# Patient Record
Sex: MALE | Race: WHITE | URBAN - METROPOLITAN AREA
[De-identification: names, ages, dates, MRNs, and addresses within clinical notes are randomized per-mention and may not be internally consistent; named-entity substitution may affect disease eponyms.]

---

## 2018-08-04 ENCOUNTER — HOSPITAL ENCOUNTER (OUTPATIENT)
Dept: MEDSURG UNIT | Facility: HOSPITAL | Age: 33
End: 2018-08-07
Attending: INTERNAL MEDICINE | Admitting: INTERNAL MEDICINE

## 2018-08-04 LAB
ABS NEUT (OLG): 22.72 X10(3)/MCL (ref 2.1–9.2)
ALBUMIN SERPL-MCNC: 2.9 GM/DL (ref 3.4–5)
ALBUMIN/GLOB SERPL: 0.5 RATIO (ref 1.1–2)
ALP SERPL-CCNC: 112 UNIT/L (ref 50–136)
ALT SERPL-CCNC: 25 UNIT/L (ref 12–78)
AST SERPL-CCNC: 13 UNIT/L (ref 15–37)
BASOPHILS # BLD AUTO: 0.1 X10(3)/MCL (ref 0–0.2)
BASOPHILS NFR BLD AUTO: 0 %
BILIRUB SERPL-MCNC: 0.7 MG/DL (ref 0.2–1)
BILIRUBIN DIRECT+TOT PNL SERPL-MCNC: 0.3 MG/DL (ref 0–0.5)
BILIRUBIN DIRECT+TOT PNL SERPL-MCNC: 0.4 MG/DL (ref 0–0.8)
BUN SERPL-MCNC: 9 MG/DL (ref 7–18)
CALCIUM SERPL-MCNC: 9.4 MG/DL (ref 8.5–10.1)
CHLORIDE SERPL-SCNC: 104 MMOL/L (ref 98–107)
CO2 SERPL-SCNC: 25 MMOL/L (ref 21–32)
CREAT SERPL-MCNC: 0.91 MG/DL (ref 0.7–1.3)
EOSINOPHIL # BLD AUTO: 0.2 X10(3)/MCL (ref 0–0.9)
EOSINOPHIL NFR BLD AUTO: 1 %
ERYTHROCYTE [DISTWIDTH] IN BLOOD BY AUTOMATED COUNT: 14 % (ref 11.5–17)
GLOBULIN SER-MCNC: 5.4 GM/DL (ref 2.4–3.5)
GLUCOSE SERPL-MCNC: 97 MG/DL (ref 74–106)
HCT VFR BLD AUTO: 46.2 % (ref 42–52)
HGB BLD-MCNC: 15.4 GM/DL (ref 14–18)
LACTATE SERPL-SCNC: 0.8 MMOL/L (ref 0.4–2)
LYMPHOCYTES # BLD AUTO: 2.6 X10(3)/MCL (ref 0.6–4.6)
LYMPHOCYTES NFR BLD AUTO: 9 %
MCH RBC QN AUTO: 30.1 PG (ref 27–31)
MCHC RBC AUTO-ENTMCNC: 33.3 GM/DL (ref 33–36)
MCV RBC AUTO: 90.4 FL (ref 80–94)
MONOCYTES # BLD AUTO: 2.2 X10(3)/MCL (ref 0.1–1.3)
MONOCYTES NFR BLD AUTO: 8 %
NEUTROPHILS # BLD AUTO: 22.72 X10(3)/MCL (ref 1.4–7.9)
NEUTROPHILS NFR BLD AUTO: 81 %
PLATELET # BLD AUTO: 321 X10(3)/MCL (ref 130–400)
PMV BLD AUTO: 10.4 FL (ref 9.4–12.4)
POTASSIUM SERPL-SCNC: 3.4 MMOL/L (ref 3.5–5.1)
PROT SERPL-MCNC: 8.3 GM/DL (ref 6.4–8.2)
RBC # BLD AUTO: 5.11 X10(6)/MCL (ref 4.7–6.1)
SODIUM SERPL-SCNC: 140 MMOL/L (ref 136–145)
WBC # SPEC AUTO: 28 X10(3)/MCL (ref 4.5–11.5)

## 2018-08-05 LAB
ABS NEUT (OLG): 17.15 X10(3)/MCL (ref 2.1–9.2)
BASOPHILS # BLD AUTO: 0 X10(3)/MCL (ref 0–0.2)
BASOPHILS NFR BLD AUTO: 0 %
BUN SERPL-MCNC: 11 MG/DL (ref 7–18)
CALCIUM SERPL-MCNC: 8.9 MG/DL (ref 8.5–10.1)
CHLORIDE SERPL-SCNC: 104 MMOL/L (ref 98–107)
CO2 SERPL-SCNC: 26 MMOL/L (ref 21–32)
CREAT SERPL-MCNC: 0.84 MG/DL (ref 0.7–1.3)
CREAT/UREA NIT SERPL: 13.1
ERYTHROCYTE [DISTWIDTH] IN BLOOD BY AUTOMATED COUNT: 14.2 % (ref 11.5–17)
GLUCOSE SERPL-MCNC: 155 MG/DL (ref 74–106)
HCT VFR BLD AUTO: 46.3 % (ref 42–52)
HGB BLD-MCNC: 15.2 GM/DL (ref 14–18)
HIV 1+2 AB+HIV1 P24 AG SERPL QL IA: NEGATIVE
LYMPHOCYTES # BLD AUTO: 1.7 X10(3)/MCL (ref 0.6–4.6)
LYMPHOCYTES NFR BLD AUTO: 9 %
MCH RBC QN AUTO: 30.4 PG (ref 27–31)
MCHC RBC AUTO-ENTMCNC: 32.8 GM/DL (ref 33–36)
MCV RBC AUTO: 92.6 FL (ref 80–94)
MONOCYTES # BLD AUTO: 0.3 X10(3)/MCL (ref 0.1–1.3)
MONOCYTES NFR BLD AUTO: 1 %
NEUTROPHILS # BLD AUTO: 17.15 X10(3)/MCL (ref 1.4–7.9)
NEUTROPHILS NFR BLD AUTO: 88 %
PLATELET # BLD AUTO: 281 X10(3)/MCL (ref 130–400)
PMV BLD AUTO: 10.5 FL (ref 9.4–12.4)
POTASSIUM SERPL-SCNC: 4.6 MMOL/L (ref 3.5–5.1)
RBC # BLD AUTO: 5 X10(6)/MCL (ref 4.7–6.1)
SODIUM SERPL-SCNC: 140 MMOL/L (ref 136–145)
WBC # SPEC AUTO: 19.4 X10(3)/MCL (ref 4.5–11.5)

## 2018-08-06 LAB
ABS NEUT (OLG): 14.43 X10(3)/MCL (ref 2.1–9.2)
ALBUMIN SERPL-MCNC: 2.2 GM/DL (ref 3.4–5)
ALBUMIN/GLOB SERPL: 0.6 {RATIO}
ALP SERPL-CCNC: 100 UNIT/L (ref 50–136)
ALT SERPL-CCNC: 35 UNIT/L (ref 12–78)
AST SERPL-CCNC: 27 UNIT/L (ref 15–37)
BILIRUB SERPL-MCNC: 0.3 MG/DL (ref 0.2–1)
BILIRUBIN DIRECT+TOT PNL SERPL-MCNC: 0.1 MG/DL (ref 0–0.2)
BILIRUBIN DIRECT+TOT PNL SERPL-MCNC: 0.2 MG/DL (ref 0–0.8)
BUN SERPL-MCNC: 12 MG/DL (ref 7–18)
CALCIUM SERPL-MCNC: 8.7 MG/DL (ref 8.5–10.1)
CHLORIDE SERPL-SCNC: 106 MMOL/L (ref 98–107)
CO2 SERPL-SCNC: 26 MMOL/L (ref 21–32)
CREAT SERPL-MCNC: 0.79 MG/DL (ref 0.7–1.3)
ERYTHROCYTE [DISTWIDTH] IN BLOOD BY AUTOMATED COUNT: 14.4 % (ref 11.5–17)
EST. AVERAGE GLUCOSE BLD GHB EST-MCNC: 114 MG/DL
FINAL CULTURE: NORMAL
GLOBULIN SER-MCNC: 3.8 GM/DL (ref 2.4–3.5)
GLUCOSE SERPL-MCNC: 235 MG/DL (ref 74–106)
HBA1C MFR BLD: 5.6 % (ref 4.2–6.3)
HCT VFR BLD AUTO: 43.3 % (ref 42–52)
HGB BLD-MCNC: 13.7 GM/DL (ref 14–18)
LYMPHOCYTES NFR BLD MANUAL: 9 % (ref 13–40)
MCH RBC QN AUTO: 29.8 PG (ref 27–31)
MCHC RBC AUTO-ENTMCNC: 31.6 GM/DL (ref 33–36)
MCV RBC AUTO: 94.3 FL (ref 80–94)
MONOCYTES NFR BLD MANUAL: 3 % (ref 2–11)
NEUTROPHILS NFR BLD MANUAL: 88 % (ref 47–80)
PLATELET # BLD AUTO: 273 X10(3)/MCL (ref 130–400)
PLATELET # BLD EST: NORMAL 10*3/UL
PMV BLD AUTO: 10.8 FL (ref 7.4–10.4)
POTASSIUM SERPL-SCNC: 4.7 MMOL/L (ref 3.5–5.1)
PROT SERPL-MCNC: 6 GM/DL (ref 6.4–8.2)
RAPID GROUP A STREP (OHS): NEGATIVE
RBC # BLD AUTO: 4.59 X10(6)/MCL (ref 4.7–6.1)
SODIUM SERPL-SCNC: 142 MMOL/L (ref 136–145)
WBC # SPEC AUTO: 17.6 X10(3)/MCL (ref 4.5–11.5)

## 2018-08-07 LAB
ABS NEUT (OLG): 13.38 X10(3)/MCL (ref 2.1–9.2)
BASOPHILS # BLD AUTO: 0.1 X10(3)/MCL (ref 0–0.2)
BASOPHILS NFR BLD AUTO: 0 %
ERYTHROCYTE [DISTWIDTH] IN BLOOD BY AUTOMATED COUNT: 14.4 % (ref 11.5–17)
HCT VFR BLD AUTO: 43.2 % (ref 42–52)
HGB BLD-MCNC: 14.1 GM/DL (ref 14–18)
LYMPHOCYTES # BLD AUTO: 3 X10(3)/MCL (ref 0.6–4.6)
LYMPHOCYTES NFR BLD AUTO: 17 %
MCH RBC QN AUTO: 30.5 PG (ref 27–31)
MCHC RBC AUTO-ENTMCNC: 32.6 GM/DL (ref 33–36)
MCV RBC AUTO: 93.3 FL (ref 80–94)
MONOCYTES # BLD AUTO: 1 X10(3)/MCL (ref 0.1–1.3)
MONOCYTES NFR BLD AUTO: 6 %
NEUTROPHILS # BLD AUTO: 13.38 X10(3)/MCL (ref 1.4–7.9)
NEUTROPHILS NFR BLD AUTO: 74 %
PLATELET # BLD AUTO: 297 X10(3)/MCL (ref 130–400)
PMV BLD AUTO: 11.2 FL (ref 9.4–12.4)
RBC # BLD AUTO: 4.63 X10(6)/MCL (ref 4.7–6.1)
WBC # SPEC AUTO: 18.1 X10(3)/MCL (ref 4.5–11.5)

## 2018-08-11 LAB
FINAL CULTURE: NORMAL
FINAL CULTURE: NORMAL

## 2022-04-30 NOTE — CONSULTS
DATE OF CONSULTATION:  08/05/2018    ATTENDING PHYSICIAN:  Dr. Coleman Black MD  CONSULTING PHYSICIAN:  Jefe Elkins Jr., MD    CHIEF COMPLAINT:  Tonsillitis.    HISTORY OF PRESENT ILLNESS:  Alton Martinez is a 33-year-old who presented to the emergency department just yesterday evening with progressive progressively worsening sore throat over the past 5 days.  This was associated with fever, some mild myalgias and chills, and painful lymphadenopathy in his neck.  He was having difficulty swallowing even his own spit and thus presented to the emergency department.     CT scan ultimately showed diffuse tonsillitis.  There were some questions of 7 mm versus 11 and 12 mm fluid collections.  On my review, it looks mostly like phlegmonous changes with cryptic tonsils.  There is no hillary or definite drainable fluid collection from what I can appreciate at this point in time.     Since being on the IV steroids and IV Zosyn, he has noted significant improvement in his symptoms.  He is able to swallow his own secretions much easier.  He still is in some pain and still has some component of sore throat certainly at this point in time.  No prior history of peritonsillar abscess.  He is a smoker and drinks alcohol 3-4 times a week.    REVIEW OF SYSTEMS:  Otherwise negative.    PAST MEDICAL HISTORY:  None.    SURGICAL HISTORY:  Left knee surgery.    FAMILY HISTORY:  No significant family medical history.    SOCIAL HISTORY:  The patient is a smoker.  He smokes 1-2 packs a day.  He occasionally drinks alcohol 3-4 times a week.  No significant or illicit drug use history.    PHYSICAL EXAMINATION:  GENERAL:  Alert and oriented, in no acute distress.   ENT:  Ears pinnae unremarkable.  External auditory canals are clear.  No evidence of any purulent middle ear effusion.  Nose is without any purulence or polyposis.  No external deviation.  Oral cavity is notable for upper dentures.  No other mass or lesion in the oral  cavity.  Oropharynx is notable for 3+ crypt thick tonsils.  There are some exudates noted, more on the right than on the left.  No petechiae.  No airway compromise.  No floor of mouth swelling.  NECK:  Has mildly tender upper cervical adenopathy which is appropriate for his significant pharyngitis.    IMPRESSION:  Exudative tonsillitis.  I think he is on great therapy at this point in time with IV steroids and IV Zosyn.  I would recommend continuing this IV therapy both with the steroids and broad-spectrum antibiotics for another 24 hours.  Once he has continued to make improvement and tolerating a diet much easier, I would then transition him on over to oral antibiotics similar to Augmentin or clindamycin and then also send him home with a prednisone taper over the next 7 days.  If he fails to progress clinically or has any other concerning symptoms, please do not hesitate to give me a call.  He should follow up with his primary care provider within the next week or so.        ______________________________  MD STEFANO Anguiano Jr./  DD:  08/05/2018  Time:  02:25PM  DT:  08/05/2018  Time:  02:39PM  Job #:  [id number]

## 2022-04-30 NOTE — H&P
Patient:   Alton Martinez             MRN: [id number]            FIN: [id number]               Age:   33 years     Sex:  Male     :  1985   Associated Diagnoses:   None   Author:   Coleman Black MD      Basic Information   Time Seen:  Date & Time 2018 01:00:00.    Source of history:  Self, Medical record.    Present at bedside:  Family member.    Referral source:  Emergency department, DRAKE Jimenez.    History limitation:  None.    Advance directive:  Full code.    Provider information/ cc:  No PCP.       Chief Complaint   Sore throat x 5 days      History of Present Illness   Mr. Martinez is a 33 year old male who presented to the ED with c/o sore throat with fever x 5 days with fever.  The patient states that the pain was getting progressively worse to the point where he felt it was too painful to swallow anything.  He denies any n/v, chest pain, sob, or trouble breathing.  Able to handle secretions well. ED workup showed negative strep and mono, but he did have WBC of 28k.  Prelim report of CT soft tissue neck showed 1. The palatine tonsils are enlarged and demonstrate heterogenous enhancement. There is a 7 mm diameter ring enhancing fluid collection in the right palatine tonsil. An 11 x 12 mm irregular rim enhancing fluid collection is seen in the left palatine tonsil, containing loculi of air.  These findings are consistent with tonsillitis with phlegmon/early abscess. There is associated partial effacement of the oropharynx. 2. There are muliple enlarged level IIB and left level II group of lymph nodes; the larges on the right measures 16mm in short axis.  He was admitted to hospital medicine for IV antibiotics, pain control, and ENT consult.            Review of Systems   Except as documented, all other systems reviewed and negative      Health Status   Current medications:  (Selected)   Inpatient Medications  Ordered  Normal Saline Infusion 1,000 mL: 1,000 mL, 1,000 mL, IV,  1,000 mL/hr, start date 08/04/18 21:27:00 CDT  Sodium Chloride 0.9% intravenous solution 1,000 mL: 1,000 mL, 1,000 mL, IV, 125 mL/hr, start date 08/05/18 0:09:00 CDT  Toradol: 30 mg, form: Injection, IV Push, q6hr PRN for pain, moderate, Order duration: 5 day(s), first dose 08/05/18 0:09:00 CDT, stop date 08/10/18 0:08:00 CDT  Zofran: 4 mg, form: Injection, IV Push, q4hr PRN for nausea, first dose 08/05/18 0:09:00 CDT  acetaminophen: 1,000 mg, form: Tab, Oral, q6hr PRN for pain, mild, first dose 08/05/18 0:09:00 CDT      Histories   Past Medical History:  None  Surgical History:  Left knee surgery  Family History:  None  Social History:   Patient states smokes 1-2 ppd, drinks 3-4 times per week. Denies illicit drug use.      Physical Examination      Vital Signs (last 24 hrs)_____  Last Charted___________  Temp Oral     38 DegC  (AUG 05 00:21)  Heart Rate Peripheral   95 bpm  (AUG 05 00:21)  Resp Rate         20 br/min  (AUG 04 21:25)  SBP      134 mmHg  (AUG 05 00:21)  DBP      79 mmHg  (AUG 05 00:21)  SpO2      96 %  (AUG 05 00:21)     General:  Alert and oriented, No acute distress.    Cognition and Speech:  Oriented, Speech clear and coherent.    HENT:  Normocephalic, Normal hearing, Oral mucosa is moist, Bilateral tonsils are red, enlarged, with exudate. Uvula midline..    Eye:  Pupils are equal, round and reactive to light, Normal conjunctiva.    Neck:  Supple, No jugular venous distention.    Respiratory:  Lungs are clear to auscultation, Respirations are non-labored, Breath sounds are equal.    Cardiovascular:  Normal rate, Regular rhythm, No murmur, No edema.    Gastrointestinal:  Soft, Non-tender, Non-distended, Normal bowel sounds.    Integumentary:  Warm, Dry, Intact.    Musculoskeletal:  Normal strength, No tenderness, No swelling.    Neurologic:  Alert, Oriented, Normal sensory, No focal deficits.    Psychiatric:  Cooperative, Appropriate mood & affect, Normal judgment.       Review / Management    Laboratory Results   Today's Lab Results : PowerNote Discrete Results   8/4/2018 22:18 CDT       Lactic Acid Lvl           0.8 mmol/L    8/4/2018 21:37 CDT       WBC                       28.0 x10(3)/mcL  HI                             RBC                       5.11 x10(6)/mcL                             Hgb                       15.4 gm/dL                             Hct                       46.2 %                             Platelet                  321 x10(3)/mcL                             MCV                       90.4 fL                             MCH                       30.1 pg                             MCHC                      33.3 gm/dL                             RDW                       14.0 %                             MPV                       10.4 fL                             Abs Neut                  22.72 x10(3)/mcL  HI                             Neutro Auto               81 %  NA                             Lymph Auto                9 %  NA                             Mono Auto                 8 %  NA                             Eos Auto                  1 %  NA                             Abs Eos                   0.2 x10(3)/mcL                             Basophil Auto             0 %  NA                             Abs Neutro                22.72 x10(3)/mcL  HI                             Abs Lymph                 2.6 x10(3)/mcL                             Abs Mono                  2.2 x10(3)/mcL  HI                             Abs Baso                  0.1 x10(3)/mcL                             Sodium Lvl                140 mmol/L                             Potassium Lvl             3.4 mmol/L  LOW                             Chloride                  104 mmol/L                             CO2                       25.0 mmol/L                             Calcium Lvl               9.4 mg/dL                             Glucose Lvl               97 mg/dL                              BUN                       9.0 mg/dL                             Creatinine                0.91 mg/dL                             eGFR-AA                   >60 mL/min/1.73 m2  NA                             eGFR-DRAKE                  >60 mL/min/1.73 m2  NA                             Bili Total                0.7 mg/dL                             Bili Direct               0.30 mg/dL                             Bili Indirect             0.40 mg/dL                             AST                       13 unit/L  LOW                             ALT                       25 unit/L                             Alk Phos                  112 unit/L                             Total Protein             8.3 gm/dL  HI                             Albumin Lvl               2.90 gm/dL  LOW                             Globulin                  5.40 gm/dL  HI                             A/G Ratio                 0.5 ratio  LOW                             Mono Scrn                 Negative    8/4/2018 21:25 CDT       Rapid Strep Test          Review  (In Progress)       Radiology results   Prelim report of CT soft tissue neck showed 1. The palatine tonsils are enlarged and demonstrate heterogenous enhancement. There is a 7 mm diameter ring enhancing fluid collection in the right palatine tonsil. An 11 x 12 mm irregular rim enhancing fluid collection is seen in the left palatine tonsil, containing loculi of air.  These findings are consistent with tonsillitis with phlegmon/early abscess. There is associated partial effacement of the oropharynx. 2. There are muliple enlarged level IIB and left level II group of lymph nodes; the larges on the right measures 16mm in short axis.      Condition:  Stable.       Impression and Plan     Tonsillitis with possible early abscess   Antibiotics and steroids given in ED   Pain control   Awaiting ENT recommendations    GI Prophylaxis: PPI  DVT Prophylaxis: SCDs  Code Status: Full code  Admission Time  71 minutes  I, Shena Paz NP, discussed this case with Dr. Coleman Black MD       I, Coleman Coleman, assumed care of this patient today at     .    I had face to face time with this patient and I reviewed the NP/PA documentation, treatment plan and medical decision making.    A: History:fever sore throat    B: Physical Exam:tonsillitis sore throat fever    C. Medical decision making: tonsillitis with an early abscess

## 2022-04-30 NOTE — ED PROVIDER NOTES
Patient:   Alton Martinez             MRN: [id number]            FIN: [id number]               Age:   33 years     Sex:  Male     :  1985   Associated Diagnoses:   Acute tonsillitis; Leukocytosis   Author:   Virginia Armas      Basic Information   Time seen: Date & time 2018 21:23:00.   History source: Patient.   Arrival mode: Private vehicle.   History limitation: None.      History of Present Illness   The patient presents with 32 y/o male who presents with throat pain since tuesday, intermittent fever. states hurting to swallow. carly valles.  The onset was 5  days ago.  The course/duration of symptoms is constant and worsening.  Location: throat. The character of symptoms is pain.  The degree at present is moderate.  There are exacerbating factors including speaking and swallowing.  The relieving factor is none.  Risk factors consist of none.  Prior episodes: none.  Therapy today: none.  Associated symptoms: fever.        Review of Systems   Constitutional symptoms:  Fever, no chills, no weakness.    Skin symptoms:  No rash, no pruritus.    ENMT symptoms:  Sore throat.   Respiratory symptoms:  No shortness of breath, no orthopnea, no cough.    Cardiovascular symptoms:  No chest pain, no palpitations, no peripheral edema.    Gastrointestinal symptoms:  No abdominal pain, no nausea, no vomiting.    Neurologic symptoms:  No headache, no dizziness.              Additional review of systems information: All other systems reviewed and otherwise negative.      Health Status   Allergies:    No active allergies have been recorded.,    No qualifying data available  .   Immunizations: Up to date.      Past Medical/ Family/ Social History   Medical history: Negative.   Surgical history: Negative.   Family history: Not significant.   Social history: Tobacco use: Regularly.   Problem list:    No qualifying data available  .      Physical Examination               Vital Signs   Vital Signs   2018  21:25 CDT       Temperature Oral          37.6 DegC                             Temperature Oral (calculated)             99.68 DegF                             Peripheral Pulse Rate     121 bpm  HI                             Respiratory Rate          20 br/min                             SpO2                      98 %                             Oxygen Therapy            Room air                             Systolic Blood Pressure   134 mmHg                             Diastolic Blood Pressure  88 mmHg  .      No qualifying data available.   General:  Alert, no acute distress.    Skin:  Warm, dry, pink.    Head:  Normocephalic, atraumatic.    Neck:  Supple, trachea midline, no tenderness.    Eye:  Pupils are equal, round and reactive to light, extraocular movements are intact.    Ears, nose, mouth and throat:  Tympanic membranes clear, oral mucosa moist, Throat: Moderate, tonsil, erythema, with exudate, swelling, gag reflex present, no palatal petechiae, no uvula shift.    Cardiovascular:  Regular rate and rhythm, No murmur, Normal peripheral perfusion.    Respiratory:  Lungs are clear to auscultation, respirations are non-labored, breath sounds are equal.    Gastrointestinal:  Soft, Nontender, Non distended, Normal bowel sounds.    Back:  Nontender, Normal range of motion.    Musculoskeletal:  Normal ROM, normal strength.    Neurological:  Alert and oriented to person, place, time, and situation, No focal neurological deficit observed, CN II-XII intact, normal sensory observed, normal motor observed, normal speech observed, normal coordination observed.    Lymphatics:  Exam: Bilateral, posterior cervical, mildly, tender, swollen.   Psychiatric:  Cooperative, appropriate mood & affect.       Medical Decision Making   Differential Diagnosis:  Viral pharyngitis, streptococcal pharyngitis, exudative pharyngitis, mononucleosis.    Documents reviewed:  Emergency department nurses' notes.   Orders  Launch Orders    Laboratory:  Rapid Strep Test (Order): Stat collect, 8/4/2018 21:25 CDT, Throat, Nurse collect, Print Label By Order Location  Pharmacy:  Decadron (for Inj.) (Order): 8 mg, form: Injection, IM, Once-NOW, first dose 8/4/2018 21:25 CDT, stop date 8/4/2018 21:25 CDT, STAT  Rocephin 1 g injection (Order): 1 gm, form: Injection, IM, Once, first dose 8/4/2018 21:25 CDT, stop date 8/4/2018 21:25 CDT, STAT, Launch Orders   Laboratory:  Mononucleosis Screen (Order): Stat collect, 8/4/2018 21:26 CDT, Blood, Lab Collect, Print Label By Order Location, 8/4/2018 21:26 CDT  CMP (Order): Stat collect, 8/4/2018 21:26 CDT, Blood, Lab Collect, Print Label By Order Location, 8/4/2018 21:26 CDT  CBC w/ Auto Diff (Order): Now collect, 8/4/2018 21:26 CDT, Blood, Lab Collect, Print Label By Order Location, 8/4/2018 21:26 CDT  Pharmacy:  Rocephin (for IVPB) (Order): 1,000 mg, IV Piggyback, Once, Infuse over: 30 minute(s), first dose 8/4/2018 22:00 CDT, stop date 8/4/2018 22:00 CDT  Solumedrol IV push / IM (Order): 125 mg, form: Injection, IV Push, Once-NOW, first dose 8/4/2018 21:27 CDT, stop date 8/4/2018 21:27 CDT, STAT  Normal Saline Infusion 1000 mL (Order): 1,000 mL, 1,000 mL, IV, 1,000 mL/hr, start date 8/4/2018 21:27 CDT  Rocephin 1 g injection (Discontinue): 8/4/2018 21:26 CDT  Decadron (for Inj.) (Discontinue): 8/4/2018 21:26 CDT  , Launch Orders   Laboratory:  Blood Culture (Order): 8/4/2018 22:09 CDT, Stat collect, Blood, Print Label By Order Location  Blood Culture (Order): 8/4/2018 22:08 CDT, Stat collect, Blood, Print Label By Order Location  Lactic Acid (Order): Stat collect, 8/4/2018 22:08 CDT, Blood, Lab Collect, Print Label By Order Location, 8/4/2018 22:08 CDT  , Launch Orders   Radiology:  CT Soft Tissue Neck W Contrast (Order): Stat, 8/4/2018 22:58 CDT, Other (please specify), leukocytosis; tonsil swelling, None, Stretcher, Rad Type, Schedule this test  , Launch Orders   Admit/Transfer/Discharge:  Admit to Outpatient  Observation (Order): 8/4/2018 23:02 CDT, Sofia HARRIS, [facility], Acute tonsillitis  Leukocytosis, No  .    Results review:  Lab results : Lab View   8/4/2018 22:18 CDT       Lactic Acid Lvl           0.8 mmol/L    8/4/2018 21:37 CDT       Sodium Lvl                140 mmol/L                             Potassium Lvl             3.4 mmol/L  LOW                             Chloride                  104 mmol/L                             CO2                       25.0 mmol/L                             Calcium Lvl               9.4 mg/dL                             Glucose Lvl               97 mg/dL                             BUN                       9.0 mg/dL                             Creatinine                0.91 mg/dL                             eGFR-AA                   >60 mL/min/1.73 m2  NA                             eGFR-DRAKE                  >60 mL/min/1.73 m2  NA                             Bili Total                0.7 mg/dL                             Bili Direct               0.30 mg/dL                             Bili Indirect             0.40 mg/dL                             AST                       13 unit/L  LOW                             ALT                       25 unit/L                             Alk Phos                  112 unit/L                             Total Protein             8.3 gm/dL  HI                             Albumin Lvl               2.90 gm/dL  LOW                             Globulin                  5.40 gm/dL  HI                             A/G Ratio                 0.5 ratio  LOW                             WBC                       28.0 x10(3)/mcL  HI                             RBC                       5.11 x10(6)/mcL                             Hgb                       15.4 gm/dL                             Hct                       46.2 %                             Platelet                  321 x10(3)/mcL                             MCV                        90.4 fL                             MCH                       30.1 pg                             MCHC                      33.3 gm/dL                             RDW                       14.0 %                             MPV                       10.4 fL                             Abs Neut                  22.72 x10(3)/mcL  HI                             Neutro Auto               81 %  NA                             Lymph Auto                9 %  NA                             Mono Auto                 8 %  NA                             Eos Auto                  1 %  NA                             Abs Eos                   0.2 x10(3)/mcL                             Basophil Auto             0 %  NA                             Abs Neutro                22.72 x10(3)/mcL  HI                             Abs Lymph                 2.6 x10(3)/mcL                             Abs Mono                  2.2 x10(3)/mcL  HI                             Abs Baso                  0.1 x10(3)/mcL                             Mono Scrn                 Negative    8/4/2018 21:25 CDT       Rapid Strep Test          Review  (In Progress)   ,    No qualifying data available.       Impression and Plan   Diagnosis   Acute tonsillitis ([id number]-DI J03.90)   Leukocytosis ([id number] D72.829)   Plan   Condition: Stable.    Disposition: Admit time  8/4/2018 22:54:00, Place in Observation Unit.    Counseled: Patient, Regarding diagnosis, Regarding diagnostic results, Regarding treatment plan, Regarding prescription, Patient indicated understanding of instructions.       Addendum      Teaching-Supervisory Addendum-Brief   I participated in the following activities of this patients care: the medical history, the physical exam, medical decision making.   I personally performed: supervision of the patient's care, the medical history, the physical exam, the medical decision making.   The case was discussed with: midlevel provider.   Results  interpretation: I agree with the study interpretation in this patient's care.   Notes: Dr. Ballard dictating I have reviewed the mid-level note and agree with the findings. I performed an independent history and physical examination the patient had face-to-face time with the patient.    32 yo m c/o sore throat for 2 days, fever.  Pt tachycardic and has elevated wbc- will admit for IVF and get CT ST neck, pt started on IV abx in ED.

## 2022-04-30 NOTE — DISCHARGE SUMMARY
Patient:   Alton Martinez             MRN: [id number]            FIN: [id number]               Age:   33 years     Sex:  Male     :  1985   Associated Diagnoses:   Acute tonsillitis; Exudative tonsillitis; Hyperglycemia; Leukocytosis; Throat pain - Adult   Author:   Penny Choe MD      Results Review   General results   Most recent results   Discrete results only   2018 7:35 CDT        WBC                       18.1 x10(3)/mcL  HI                             RBC                       4.63 x10(6)/mcL  LOW                             Hgb                       14.1 gm/dL                             Hct                       43.2 %                             Platelet                  297 x10(3)/mcL                             MCV                       93.3 fL                             MCH                       30.5 pg                             MCHC                      32.6 gm/dL  LOW                             RDW                       14.4 %                             MPV                       11.2 fL                             Abs Neut                  13.38 x10(3)/mcL  HI                             Neutro Auto               74 %  NA                             Lymph Auto                17 %  NA                             Mono Auto                 6 %  NA                             Basophil Auto             0 %  NA                             Abs Neutro                13.38 x10(3)/mcL  HI                             Abs Lymph                 3.0 x10(3)/mcL                             Abs Mono                  1.0 x10(3)/mcL                             Abs Baso                  0.1 x10(3)/mcL                             EAG                       Date\Time Correction                             Hgb A1c                   Date\Time Correction           Discharge Information   Discharge Summary Information:  Admitted  2018, Discharged  2018.         Admitting physician:  Sofia HARRIS, [facility].         Referring physician for admission: Elio HARRIS, Sreedhar SCHULTZ.         Consulting physician: Brittni Moody MD, Jefe ROLLINS         Discharge diagnosis: Acute tonsillitis (TCP15-WM J03.90), Exudative tonsillitis (NHO64-AC J03.90), Hyperglycemia (TNL71-IF R73.9), Leukocytosis (XYM57-XS D72.829), Throat pain - Adult (PNED [id number]).       Physical Examination   Vital Signs   8/7/2018 11:14 CDT       Temperature Oral          36.9 DegC                             Temperature Oral (calculated)             98.42 DegF                             Peripheral Pulse Rate     65 bpm                             SpO2                      97 %                             Systolic Blood Pressure   141 mmHg  HI                             Diastolic Blood Pressure  91 mmHg  HI                             Mean Arterial Pressure, Cuff              107 mmHg    8/7/2018 9:47 CDT        Temperature Oral          36.8 DegC                             Temperature Oral (calculated)             98.24 DegF                             Peripheral Pulse Rate     60 bpm                             Respiratory Rate          18 br/min                             SpO2                      98 %                             Oxygen Therapy            Room air                             Systolic Blood Pressure   134 mmHg                             Diastolic Blood Pressure  84 mmHg                             Mean Arterial Pressure, Cuff              101 mmHg     General:  Alert and oriented.    Eye:  Pupils are equal, round and reactive to light.    HENT:  Normocephalic.    Neck:  Supple.    Respiratory:  Lungs are clear to auscultation, Respirations are non-labored.    Cardiovascular:  Normal rate, Regular rhythm.    Gastrointestinal:  Soft, Non-tender.    Musculoskeletal:  Normal range of motion.    Integumentary:  Warm, Pink.    Neurologic:  Alert, Oriented, No focal deficits.    Psychiatric:   Cooperative.           Hospital Course   Hospital Course   Admitted from: from emergency department.     Admitting diagnosis: Acute tonsillitis (FIB57-VU J03.90), Exudative tonsillitis (LOX69-MZ J03.90), Hyperglycemia (ORV43-WX R73.9), Leukocytosis (OQP06-LL D72.829), Throat pain - Adult (PNED [id number]).     Admission disposition: admit to medical bed.     Length of stay: days  3.     Medical management.     33-year-old male with no significant past medical history admitted to hospitalist service on 8/5/2018 with sore throat and fever ×5 days.  The Pain got worsened and the patient was unable to swallow anything at all.  Given worsening symptoms patient presented to the ED.  Tachycardic on initial evaluation.  Stable blood pressure.  Labs significant for leukocytosis-white count of 20 8K.  Otherwise unremarkable.  CT soft tissue neck showed enlarged tonsils with heterogenous enhancement and a 7 mL diameter ring-enhancing fluid collection in the right palatine tonsil with another larger fluid collection which is irregular containing localized area in the left palatine tonsil consistent with tonsillitis/early abscess.  Multiple lymph node enlargements same.  ENT was consulted and the patient was admitted to hospitalist medicine on IV antibiotics and pain control.  Patient is on Solu-Medrol and Zosyn.  ENT recommended to continue current therapy with steroids and antibiotics.  Significant improvement in symptoms with steroids and antibiotics.  Patient able to tolerate oral diet.  No more dysphagia or odynophagia.  No throat pain.  Afebrile since hospital admission.  Hemodynamically stable.  ENT had recommended continuation of IV antibiotics until Monday and discharged on by mouth antibiotics/tapering steroids.  He was noted to be hyperglycemic, secondary to steroids.  Hemoglobin A1c was within normal limits.  Received sliding scale while in hospital.  Also noted to have significant  leukocytosis, improved from admission. But Persistent leukocytosis is most likely secondary to steroids.  Even symptomatic improvement patient was discharged home on by mouth antibiotics-Augmentin along with Medrol Dosepak.  Protonix while on Medrol Dosepak.  Strictly counseled regarding alcohol/smoking cessation.  Patient was understanding.  He does not have a PCP.  Was able to arrange PCP with [facility].  All treatment plans discussed with the patient and he voiced understanding to everything explained         Discharge Plan   Discharge Summary Plan   Discharge Status: improved.     Discharge instructions given: to patient.     Discharge disposition: discharge to home.     Prescriptions: per med rec.     Course   Improving.     Progressing as expected.     Well controlled.     Education and Follow-up   Counseled: patient.     Discharge Planning: Tonsillitis, Easy-to-Read, Anytime the conditions worsen, return to clinic or go to ED; Follow up with primary care provider 12/13/2018 09:00:00 [facility] ([facility])  formerly [facility]  [phone]  Internal medicine clinic  ; Carlita Mei NP at [facility] 12/13/2018 09:00:00 [phone].     DC Time was 36 mnts